# Patient Record
Sex: FEMALE | NOT HISPANIC OR LATINO | URBAN - METROPOLITAN AREA
[De-identification: names, ages, dates, MRNs, and addresses within clinical notes are randomized per-mention and may not be internally consistent; named-entity substitution may affect disease eponyms.]

---

## 2017-08-31 ENCOUNTER — IMPORTED ENCOUNTER (OUTPATIENT)
Dept: URBAN - METROPOLITAN AREA CLINIC 1 | Facility: CLINIC | Age: 62
End: 2017-08-31

## 2017-08-31 PROBLEM — H52.4: Noted: 2017-08-31

## 2017-08-31 PROCEDURE — S0621 ROUTINE OPHTHALMOLOGICAL EXA: HCPCS

## 2017-08-31 NOTE — PATIENT DISCUSSION
1.  Presbyopia: Rx was given for corrective spectacles if indicated. 2.  Hx of cataracts-OU3. RAFITA-OU-REC patient to cont using AT BID OU 4. Return for an appointment in 1 week for Contact lens check. with Dr. Kaylee Quezada. 5.  Return for an appointment in 1 year for 40 and Contact lens check. with Dr. Kaylee Quezada.

## 2017-09-14 ENCOUNTER — IMPORTED ENCOUNTER (OUTPATIENT)
Dept: URBAN - METROPOLITAN AREA CLINIC 1 | Facility: CLINIC | Age: 62
End: 2017-09-14

## 2017-09-26 ENCOUNTER — IMPORTED ENCOUNTER (OUTPATIENT)
Dept: URBAN - METROPOLITAN AREA CLINIC 1 | Facility: CLINIC | Age: 62
End: 2017-09-26

## 2017-09-26 NOTE — PATIENT DISCUSSION
CC today- Final rx for CL given. F/u in 1 year for 40/cc. Can use 1.75-2.00 or 2.50 OTC readers PRN.

## 2018-10-26 ENCOUNTER — IMPORTED ENCOUNTER (OUTPATIENT)
Dept: URBAN - METROPOLITAN AREA CLINIC 1 | Facility: CLINIC | Age: 63
End: 2018-10-26

## 2018-10-26 PROBLEM — H52.4: Noted: 2018-10-26

## 2018-10-26 PROBLEM — H52.13: Noted: 2018-10-26

## 2018-10-26 PROCEDURE — S0621 ROUTINE OPHTHALMOLOGICAL EXA: HCPCS

## 2018-10-26 NOTE — PATIENT DISCUSSION
1. Myopia: Rx was given for correction if indicated and requested. 2. Presbyopia 3. Dry Eyes w/ PEK OU 4. Cataract OU CTL trials givenReturn for an appointment in next week cc with Dr. Cindy Proctor.

## 2018-11-01 ENCOUNTER — IMPORTED ENCOUNTER (OUTPATIENT)
Dept: URBAN - METROPOLITAN AREA CLINIC 1 | Facility: CLINIC | Age: 63
End: 2018-11-01

## 2018-11-01 NOTE — PATIENT DISCUSSION
1.  CC today - Good fit good comfort. New CTL trial given to help sharpen OS vision Return for an appointment in 1 week cc with Dr. Dario De La Torre. Return for an appointment in 1 year 40/cc with Dr. Dario De La Torre.

## 2019-11-11 ENCOUNTER — IMPORTED ENCOUNTER (OUTPATIENT)
Dept: URBAN - METROPOLITAN AREA CLINIC 1 | Facility: CLINIC | Age: 64
End: 2019-11-11

## 2019-11-11 PROBLEM — H52.13: Noted: 2019-11-11

## 2019-11-11 PROBLEM — H52.4: Noted: 2019-11-11

## 2019-11-11 PROCEDURE — 92014 COMPRE OPH EXAM EST PT 1/>: CPT

## 2019-11-11 PROCEDURE — 92015 DETERMINE REFRACTIVE STATE: CPT

## 2019-11-11 NOTE — PATIENT DISCUSSION
1. Myopia: Rx was given for correction if indicated and requested. 2. Presbyopia 3. Dry Eyes w/ PEK OU 4. Cataract OU Changes made and finalized CTL RXReturn for an appointment in 1 year 40/cc with Dr. Katia Hamilton. Return for an appointment in 6 months 30/glare with Dr. Katia Hamilton.

## 2020-03-03 NOTE — PATIENT DISCUSSION
Also candidate for RLE OU if pt unable to achieve success with monova CL trial. Goal OD: Sym gerber OS: TMF gerber. Pt ed she will likely need LASIK enh 6-8 weeks after surgery OS.

## 2020-12-16 ENCOUNTER — IMPORTED ENCOUNTER (OUTPATIENT)
Dept: URBAN - METROPOLITAN AREA CLINIC 1 | Facility: CLINIC | Age: 65
End: 2020-12-16

## 2020-12-16 PROBLEM — H44.23: Noted: 2020-12-16

## 2020-12-16 PROBLEM — H52.4: Noted: 2020-12-16

## 2020-12-16 PROBLEM — H52.223: Noted: 2020-12-16

## 2020-12-16 PROCEDURE — S0621 ROUTINE OPHTHALMOLOGICAL EXA: HCPCS

## 2020-12-16 NOTE — PATIENT DISCUSSION
1. Myopia: Rx was given for correction if indicated and requested. 2. Presbyopia 3. Dry Eyes w/ PEK OU- Recommend the use of AT's BID OU (sample given of Refresh Relivea)4. Cataract OU- ObserveReturn for an appointment in 1 yr 40/CC with Dr. Marcial Cardoso. Return for an appointment in 6 mo 30/glare with Dr. Marcial Cardoso.

## 2022-04-02 ASSESSMENT — VISUAL ACUITY
OS_SC: 20/20-1
OS_SC: 20/20
OS_SC: 20/25
OD_SC: 20/25
OS_CC: J2
OD_SC: 20/30
OS_SC: 20/30
OS_SC: 20/20
OD_SC: 20/20
OD_CC: J2
OD_SC: 20/20
OD_SC: 20/25
OS_SC: 20/30
OD_SC: 20/30
OD_SC: 20/25+2
OS_SC: 20/20

## 2022-04-02 ASSESSMENT — TONOMETRY
OD_IOP_MMHG: 20
OS_IOP_MMHG: 18
OS_IOP_MMHG: 19
OD_IOP_MMHG: 18
OS_IOP_MMHG: 19
OD_IOP_MMHG: 19
OD_IOP_MMHG: 17
OS_IOP_MMHG: 17